# Patient Record
Sex: FEMALE | Race: WHITE | NOT HISPANIC OR LATINO | URBAN - METROPOLITAN AREA
[De-identification: names, ages, dates, MRNs, and addresses within clinical notes are randomized per-mention and may not be internally consistent; named-entity substitution may affect disease eponyms.]

---

## 2017-10-20 ENCOUNTER — TELEPHONE (OUTPATIENT)
Dept: OPHTHALMOLOGY | Facility: CLINIC | Age: 72
End: 2017-10-20

## 2017-10-20 NOTE — TELEPHONE ENCOUNTER
----- Message from Felisa Ponce sent at 10/20/2017  2:38 PM CDT -----  Regarding: Please forward to appropriate person  Contact: [phone]  Pt Ph # [phone],    Referring Clinic called today to see if you can please move up Pt Appt,    Pt scheduled on 12/08/17 with Lesia Joya for Uveitis,    They would like her seen sooner please,    Thanks,  Felisa in Call Center    Please DO NOT send this message and/or reply back to sender.  Call Center Representatives DO NOT respond to messages.

## 2017-10-20 NOTE — TELEPHONE ENCOUNTER
Left message at [phone] on phone contact number below (pt's home number)  Dr. white in one 1/2 day on Friday at our clinic  Out this Friday and next Friday and unable to schedule sooner  Asked pt to make sure referring notes faxed and provided fax number and direct triage number for further assistance.  Srinivas Pathak RN 2:51 PM 10/20/17

## 2017-10-26 NOTE — TELEPHONE ENCOUNTER
Pt scheduled December 6th with dr. white    Spoke to pt on call back today  No current symptoms  Reviewed primary eye MD to manage until appt with dr. christopher White to review long- tem treatment options then and pt to f/u with local eye doctor for new symptoms  Reviewed dr. white at [facility] for Uveitis clinic 1/2 day per week and unable to accommodate sooner appt.  Srinivas Pathak RN 7:56 AM 10/26/17

## 2017-12-08 ENCOUNTER — OFFICE VISIT (OUTPATIENT)
Dept: OPHTHALMOLOGY | Facility: CLINIC | Age: 72
End: 2017-12-08
Attending: OPHTHALMOLOGY
Payer: MEDICARE

## 2017-12-08 DIAGNOSIS — Z83.511 FAMILY HISTORY OF GLAUCOMA: ICD-10-CM

## 2017-12-08 DIAGNOSIS — H20.9 ANTERIOR UVEITIS: Primary | ICD-10-CM

## 2017-12-08 DIAGNOSIS — Z96.1 PSEUDOPHAKIA OF BOTH EYES: ICD-10-CM

## 2017-12-08 DIAGNOSIS — H20.9 ANTERIOR UVEITIS: ICD-10-CM

## 2017-12-08 LAB
ALBUMIN SERPL-MCNC: 3.8 G/DL (ref 3.4–5)
ALP SERPL-CCNC: 89 U/L (ref 40–150)
ALT SERPL W P-5'-P-CCNC: 32 U/L (ref 0–50)
ANION GAP SERPL CALCULATED.3IONS-SCNC: 8 MMOL/L (ref 3–14)
AST SERPL W P-5'-P-CCNC: 19 U/L (ref 0–45)
BASOPHILS # BLD AUTO: 0.1 10E9/L (ref 0–0.2)
BASOPHILS NFR BLD AUTO: 0.8 %
BILIRUB SERPL-MCNC: 0.3 MG/DL (ref 0.2–1.3)
BUN SERPL-MCNC: 12 MG/DL (ref 7–30)
CALCIUM SERPL-MCNC: 8.9 MG/DL (ref 8.5–10.1)
CHLORIDE SERPL-SCNC: 106 MMOL/L (ref 94–109)
CO2 SERPL-SCNC: 25 MMOL/L (ref 20–32)
CREAT SERPL-MCNC: 0.9 MG/DL (ref 0.52–1.04)
DIFFERENTIAL METHOD BLD: ABNORMAL
EOSINOPHIL # BLD AUTO: 0.2 10E9/L (ref 0–0.7)
EOSINOPHIL NFR BLD AUTO: 2.5 %
ERYTHROCYTE [DISTWIDTH] IN BLOOD BY AUTOMATED COUNT: 13.8 % (ref 10–15)
GFR SERPL CREATININE-BSD FRML MDRD: 61 ML/MIN/1.7M2
GLUCOSE SERPL-MCNC: 81 MG/DL (ref 70–99)
HCT VFR BLD AUTO: 40.7 % (ref 35–47)
HGB BLD-MCNC: 12.6 G/DL (ref 11.7–15.7)
IMM GRANULOCYTES # BLD: 0 10E9/L (ref 0–0.4)
IMM GRANULOCYTES NFR BLD: 0.2 %
LYMPHOCYTES # BLD AUTO: 3.7 10E9/L (ref 0.8–5.3)
LYMPHOCYTES NFR BLD AUTO: 38 %
MCH RBC QN AUTO: 28.8 PG (ref 26.5–33)
MCHC RBC AUTO-ENTMCNC: 31 G/DL (ref 31.5–36.5)
MCV RBC AUTO: 93 FL (ref 78–100)
MONOCYTES # BLD AUTO: 0.6 10E9/L (ref 0–1.3)
MONOCYTES NFR BLD AUTO: 6.5 %
NEUTROPHILS # BLD AUTO: 5 10E9/L (ref 1.6–8.3)
NEUTROPHILS NFR BLD AUTO: 52 %
NRBC # BLD AUTO: 0 10*3/UL
NRBC BLD AUTO-RTO: 0 /100
PLATELET # BLD AUTO: 303 10E9/L (ref 150–450)
POTASSIUM SERPL-SCNC: 3.9 MMOL/L (ref 3.4–5.3)
PROT SERPL-MCNC: 7.2 G/DL (ref 6.8–8.8)
RBC # BLD AUTO: 4.37 10E12/L (ref 3.8–5.2)
SODIUM SERPL-SCNC: 139 MMOL/L (ref 133–144)
WBC # BLD AUTO: 9.6 10E9/L (ref 4–11)

## 2017-12-08 PROCEDURE — 99213 OFFICE O/P EST LOW 20 MIN: CPT | Mod: ZF

## 2017-12-08 PROCEDURE — 92133 CPTRZD OPH DX IMG PST SGM ON: CPT | Mod: ZF | Performed by: OPHTHALMOLOGY

## 2017-12-08 RX ORDER — BUPROPION HYDROCHLORIDE 300 MG/1
TABLET ORAL
COMMUNITY

## 2017-12-08 RX ORDER — TRIAMCINOLONE ACETONIDE 1 MG/G
CREAM TOPICAL
COMMUNITY

## 2017-12-08 RX ORDER — LOSARTAN POTASSIUM 100 MG/1
TABLET ORAL
COMMUNITY
Start: 2012-12-01

## 2017-12-08 RX ORDER — LEVOTHYROXINE SODIUM 112 UG/1
TABLET ORAL
COMMUNITY

## 2017-12-08 RX ORDER — KETOCONAZOLE 20 MG/G
CREAM TOPICAL
COMMUNITY
Start: 2015-12-01

## 2017-12-08 RX ORDER — PREDNISOLONE ACETATE 10 MG/ML
SUSPENSION/ DROPS OPHTHALMIC
COMMUNITY

## 2017-12-08 RX ORDER — GABAPENTIN 300 MG/1
CAPSULE ORAL
COMMUNITY

## 2017-12-08 RX ORDER — GLIMEPIRIDE 1 MG/1
TABLET ORAL
COMMUNITY
Start: 2012-12-01

## 2017-12-08 RX ORDER — ATORVASTATIN CALCIUM 20 MG/1
TABLET, FILM COATED ORAL
COMMUNITY

## 2017-12-08 RX ORDER — ACYCLOVIR 400 MG/1
TABLET ORAL
COMMUNITY

## 2017-12-08 RX ORDER — MIRTAZAPINE 30 MG/1
TABLET, FILM COATED ORAL
COMMUNITY

## 2017-12-08 RX ORDER — VENLAFAXINE HYDROCHLORIDE 150 MG/1
CAPSULE, EXTENDED RELEASE ORAL
COMMUNITY

## 2017-12-08 ASSESSMENT — VISUAL ACUITY
OS_CC+: -1
OD_CC+: -2
CORRECTION_TYPE: GLASSES
OS_CC: 20/25
OD_CC: 20/20
METHOD: SNELLEN - LINEAR

## 2017-12-08 ASSESSMENT — REFRACTION_WEARINGRX
OD_HPRISM: 5 BO
OS_ADD: +2.50
OD_ADD: +2.50
OS_AXIS: 165
OS_HPRISM: 5 BO
OS_VPRISM: 1 BU
OS_CYLINDER: +1.50
OD_AXIS: 170
OS_SPHERE: -2.00
OD_SPHERE: -0.50
OD_CYLINDER: +1.00

## 2017-12-08 ASSESSMENT — SLIT LAMP EXAM - LIDS
COMMENTS: NORMAL
COMMENTS: NORMAL

## 2017-12-08 ASSESSMENT — CONF VISUAL FIELD
METHOD: COUNTING FINGERS
OD_NORMAL: 1
OS_NORMAL: 1

## 2017-12-08 ASSESSMENT — TONOMETRY
IOP_METHOD: TONOPEN
OS_IOP_MMHG: 21
OD_IOP_MMHG: 19

## 2017-12-08 ASSESSMENT — CUP TO DISC RATIO
OD_RATIO: 0.55
OS_RATIO: 0.5

## 2017-12-08 ASSESSMENT — EXTERNAL EXAM - LEFT EYE: OS_EXAM: NORMAL

## 2017-12-08 ASSESSMENT — EXTERNAL EXAM - RIGHT EYE: OD_EXAM: NORMAL

## 2017-12-08 NOTE — LETTER
"December 8, 2017      Shawn Ronan, MD [facility]  [address] [facility] [place] [address]  Fax: [phone]        RE: Viji Little  [address]  [facility] [address]       Dear Dr. Perera,    Thank you for referring your patient, Viji Little, to the uveitis clinic at [facility]. Her uveitis was quiescent today off treatment. Anterior uveitis workup was unremarkable. I did not pursue additional workup (such as intraocular lymphoma workup) since there was no sign of intermediate or posterior uveitis. Please see a copy of my visit note below.    CC: Uveitis evaluation    HPI: Viji Little is a 72 year old female who presents with a history of recurrent anterior uveitis both eyes. She reports having constant photophobia both eyes, not really worse with flares. She finished a taper of Predforte last week, no eye drops currently. She feels that her eyes are at baseline today.    Ocular history:   1. 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 Sometimes simultaneous, sometimes unilateral. Flare symptoms: eye redness, fuzzy vision, sometimes pain. Has been treated only with eye drops: prednisolone acetate, dilating drops. She reports 1 iritis flare in the last year. Usually treated for about 2 weeks, though most recent flare lasted longer (end of August - Oct 20, ~2 months).   *Exam notes from most recent flare mention \"vitritis\" but this may have been cellular spillover from anterior inflammation.    2. Cataract extraction/IOL both eyes (Feb 2016 right eye) (June 2017 left eye).    No history of ocular injections or laser. No history of ocular injury.    Medical history: Positive for depression, asthma, HTN, diabetes mellitus type 2, basal cell carcinoma x3 s/p excision (2012, 2017x2), genital herpes, broken leg (2007), s/p cholecystectomy (2000).    Review of systems: Positive for hearing loss, nodule on left ear lobe (but " "not painful, erythematous, or with associated swelling), shortness of breath with exertion (stable, patient attributes to asthma - follows with pulmonary), and genital sores (diagnosed with herpes, symptoms for 30 years, has taken acyclovir to control for the last 15 years). No fatigue, headache, fevers/chills, seizures, syncope, numbness/tingling, weakness, oral ulcers, pathergy reaction, chronic cough, rash, joint pain/stiffness, diarrhea, bloody stools, or hematuria.    Family history: Positive for glaucoma (sister, maternal aunt), vasculitis (maternal aunt), breast cancer (sister), and multiple myeloma (maternal aunt).    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 She does not drink alcohol. No IV drug use. No pets. She has never lived outside the [place].    Laboratory/Imaging Results:  12.8.17 labs here: Quantiferon-Tb, RPR, anti-Treponemal antibody, serum ACE, anti-MPO Ab, anti-pro3 Ab, and complete metabolic panel (CMP) all negative/normal. complete blood count (CBC) with differential within normal limits except for slightly low MCHC (31.0, normal range 31.5-36.5).  Outside studies:  4.26.17 Chest CT report:  \"FINDINGS: Previous groundglass infiltrates have resolved. Scattered peripheral nodules have also resolved. Calcified granuloma in the right upper lobe demonstrated on image 9. No acute consolidation.  Heart size normal. No pericardial or pleural effusion. Calcified mediastinal lymph nodes are again noted, consistent with granulomatous disease. 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  Limited visualization of the upper abdomen. No adenopathy. Cholecystectomy noted.\"  1.26.17: ESR slightly elevated at 27 (normal 0-20).  12.21.16: Complete blood count (CBC) with differential and BMP within normal limits.  12.6.13: RF, SABIHA neg    Ocular Imaging:  Macular OCT 12.8.17:  Right eye: good foveal contour, no IRF/SRF,  um.  Left eye: good foveal contour, no IRF/SRF, CSF " 310 um.    RNFL OCT 12.8.17:  Right eye: G77. Thin TS, borderline NI, other segments within normal limits.  Left eye: G77. Thin TS, borderline NI, other segments within normal limits.    Impression/Plan:  1. 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 No sign of associated intermediate or posterior uveitis. No inflammatory sequelae. Without definite intermediate or posterior involvement, intraocular lymphoma is unlikely, so do not recommend lymphoma workup based on ocular disease at this time.   - Basic anterior uveitis workup ordered today and negative (as detailed above).   - Granulomas noted on chest CT, so sarcoidosis is a consideration, despite normal ACE.   - With relatively infrequent flares that can be controlled with topical corticosteroids, systemic                 immunosuppression is not indicated at this time.   - Recommend topical corticosteroids as needed to control disease, though no treatment needed at                 this time.    2. Pseudophakia both eyes, stable.   - Monitor    3. Glaucoma suspect based on enlarged C:D ratio and thinning on retinal nerve fiber layer OCT. Also, positive family history and intraocular pressures on high end of normal today (19 right eye, 21 left eye).   - Recommend glaucoma suspect evaluation at [facility] within 2 months    4. 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   - Artificial tears as needed    Return to uveitis clinic in 6 months, V/T.      Again, thank you for allowing me to participate in the care of your patient.        Sincerely,      Lesia Joya MD

## 2017-12-08 NOTE — PROGRESS NOTES
"CC: uveitis evaluation    HPI: Viji Little is a 72 year old female who presents with a history of recurrent anterior uveitis both eyes. She reports having constant photophobia both eyes, not really worse with flares. She finished a taper of Predforte last week, no eye drops currently. She feels that her eyes are at baseline today.    Ocular history:   1. 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 Sometimes simultaneous, sometimes unilateral. Flare symptoms: eye redness, fuzzy vision, sometimes pain. Has been treated only with eye drops: prednisolone acetate, dilating drops. She reports 1 iritis flare in the last year. Usually treated for about 2 weeks, though most recent flare lasted longer (end of August - Oct 20, ~2 months).   *Exam notes from most recent flare mention \"vitritis\" but this may have been cellular spillover from anterior inflammation.    2. Cataract extraction/IOL both eyes (Feb 2016 right eye) (June 2017 left eye).    No history of ocular injections or laser. No history of ocular injury.    Medical history: Positive for depression, asthma, HTN, diabetes mellitus type 2, basal cell carcinoma x3 s/p excision (2012, 2017x2), genital herpes, broken leg (2007), s/p cholecystectomy (2000).    Review of systems: Positive for hearing loss, nodule on left ear lobe (but not painful, erythematous, or with associated swelling), shortness of breath with exertion (stable, patient attributes to asthma - follows with pulmonary), and genital sores (diagnosed with herpes, symptoms for 30 years, has taken acyclovir to control for the last 15 years). No fatigue, headache, fevers/chills, seizures, syncope, numbness/tingling, weakness, oral ulcers, pathergy reaction, chronic cough, rash, joint pain/stiffness, diarrhea, bloody stools, or hematuria.    Family history: Positive for glaucoma (sister, maternal aunt), vasculitis (maternal aunt), breast cancer (sister), and multiple myeloma " "(maternal aunt).    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 She does not drink alcohol. No IV drug use. No pets. She has never lived outside the [place].    Laboratory/Imaging Results:  12.8.17 labs here: Quantiferon-Tb, RPR, anti-Treponemal antibody, serum ACE, anti-MPO Ab, anti-pro3 Ab, and complete metabolic panel (CMP) all negative/normal. complete blood count (CBC) with differential within normal limits except for slightly low MCHC (31.0, normal range 31.5-36.5).  Outside studies:  4.26.17 Chest CT report:  \"FINDINGS: Previous groundglass infiltrates have resolved. Scattered peripheral nodules have also resolved. Calcified granuloma in the right upper lobe demonstrated on image 9. No acute consolidation.  Heart size normal. No pericardial or pleural effusion. Calcified mediastinal lymph nodes are again noted, consistent with granulomatous disease. 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  Limited visualization of the upper abdomen. No adenopathy. Cholecystectomy noted.\"  1.26.17: ESR slightly elevated at 27 (normal 0-20).  12.21.16: Complete blood count (CBC) with differential and BMP within normal limits.  12.6.13: RF, SABIHA neg    Ocular Imaging:  Macular OCT 12.8.17:  Right eye: good foveal contour, no IRF/SRF,  um.  Left eye: good foveal contour, no IRF/SRF,  um.    RNFL OCT 12.8.17:  Right eye: G77. Thin TS, borderline NI, other segments within normal limits.  Left eye: G77. Thin TS, borderline NI, other segments within normal limits.    Impression/Plan:  1. 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 No sign of associated intermediate or posterior uveitis. No inflammatory sequelae. Without definite intermediate or posterior involvement, intraocular lymphoma is unlikely, so do not recommend lymphoma workup based on ocular disease at this time.   - Basic anterior uveitis workup ordered today and negative (as " detailed above).   - Granulomas noted on chest CT, so sarcoidosis is a consideration, despite normal ACE.   - With relatively infrequent flares that can be controlled with topical corticosteroids, systemic immunosuppression is not indicated at this time.   - Recommend topical corticosteroids as needed to control disease, though no treatment needed at this time.    2. Pseudophakia both eyes, stable.   - Monitor    3. Glaucoma suspect based on enlarged C:D ratio and thinning on retinal nerve fiber layer OCT. Also, positive family history and intraocular pressures on high end of normal today (19 right eye, 21 left eye).   - Recommend glaucoma suspect evaluation at [facility] within 2 months    4. 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   - Artificial tears as needed    Return to uveitis clinic in 6 months, V/T.      Attending Physician Attestation:  Complete documentation of historical and exam elements from today's encounter can be found in the full encounter summary report (not reduplicated in this progress note).  I personally obtained the chief complaint(s) and history of present illness.  I confirmed and edited as necessary the review of systems, past medical/surgical history, family history, social history, and examination findings as documented by others; and I examined the patient myself.  I personally reviewed the relevant tests, images, and reports as documented above.  I formulated and edited as necessary the assessment and plan and discussed the findings and management plan with the patient and family.  - Lesia Joya M.D.

## 2017-12-08 NOTE — PATIENT INSTRUCTIONS
Follow up at [facility] within 2 months for glaucoma suspect evaluation (thinning on retinal nerve fiber layer OCT).

## 2017-12-08 NOTE — NURSING NOTE
Chief Complaints and History of Present Illnesses   Patient presents with     Consult For     Anterior Uveitis     HPI    Affected eye(s):  Both   Symptoms:        Frequency:  Constant       Do you have eye pain now?:  No      Comments:  States va is the same  Hx of Flashes and Floaters  No red watery or dry  Radha Chaney COT 12:41 PM December 8, 2017

## 2017-12-08 NOTE — MR AVS SNAPSHOT
After Visit Summary   12/8/2017    Viji Little    MRN: [id number]           Patient Information     Date Of Birth          1945        Visit Information        Provider Department      12/8/2017 12:30 PM Lesia Joya MD [facility]        Today's Diagnoses     Anterior uveitis    -  1      Care Instructions    Follow up at [facility] within 2 months for glaucoma suspect evaluation (thinning on retinal nerve fiber layer OCT).          Follow-ups after your visit        Follow-up notes from your care team     Return in about 6 months (around 6/8/2018).      Your next 10 appointments already scheduled     Jun 08, 2018 12:30 PM CDT   RETURN UVEITIS with Lesia Joya MD   Eye Clinic ([facility])    [address]  [address]  [facility] [address] [phone]              Future tests that were ordered for you today     Open Future Orders        Priority Expected Expires Ordered    Angiotensin converting enzyme Routine  3/8/2018 12/8/2017    Vasculitis panel Routine  3/8/2018 12/8/2017    CBC with platelets differential Routine  3/8/2018 12/8/2017    Comprehensive metabolic panel Routine  3/8/2018 12/8/2017    RPR screen with reflex to confirm Routine  3/8/2018 12/8/2017    Anti Treponema Routine  3/8/2018 12/8/2017    M Tuberculosis by Quantiferon Routine  3/8/2018 12/8/2017            Who to contact     Please call your clinic at [phone] to:    Ask questions about your health    Make or cancel appointments    Discuss your medicines    Learn about your test results    Speak to your doctor   If you have compliments or concerns about an experience at your clinic, or if you wish to file a complaint, please contact [facility] Patient Relations at [phone] or email us at [email or web address]         Additional Information About Your Visit        MyChart Information     MyChart gives you  secure access to your electronic health record. If you see a primary care provider, you can also send messages to your care team and make appointments. If you have questions, please call your primary care clinic.  If you do not have a primary care provider, please call [phone] and they will assist you.      "[employer]" is an electronic gateway that provides easy, online access to your medical records. With "[employer]", you can request a clinic appointment, read your test results, renew a prescription or communicate with your care team.     To access your existing account, please contact your [facility] or call [phone] for assistance.        Care EveryWhere ID     This is your Care EveryWhere ID. This could be used by other organizations to access your [facility] medical records  [id number]         Blood Pressure from Last 3 Encounters:   No data found for BP    Weight from Last 3 Encounters:   No data found for Wt              We Performed the Following     OCT Optic Nerve RNFL Spectralis OU (both eyes)     OCT Retina Spectralis OU (both eyes)        Primary Care Provider Office Phone # Fax #    Kwadwo GAEL [facility] [phone]       [facility] [address]   [facility] [address]        Equal Access to Services     BILLY VIEYRA : Hadii aad ku hadasho Soomaali, waaxda luqadaha, qaybta kaalmada adeegyada, waxay idiin hayaan alo palominoarapraveen lakristal valentine. So [facility] [phone].    ATENCIÓN: Si habla español, tiene a edge disposición servicios gratuitos de asistencia lingüística. Llame al [phone].    We comply with applicable federal civil rights laws and [place] laws. We do not discriminate on the basis of race, color, national origin, age, disability, sex, sexual orientation, or gender identity.            Thank you!     Thank you for choosing EYE CLINIC  for your care. Our goal is always to provide you with excellent care. Hearing back from our patients is one  way we can continue to improve our services. Please take a few minutes to complete the written survey that you may receive in the mail after your visit with us. Thank you!             Your Updated Medication List - Protect others around you: Learn how to safely use, store and throw away your medicines at www.disposemymeds.org.          This list is accurate as of: 12/8/17  3:09 PM.  Always use your most recent med list.                   Brand Name Dispense Instructions for use Diagnosis    acyclovir 400 MG tablet    ZOVIRAX          aspirin 81 MG tablet           atorvastatin 20 MG tablet    LIPITOR          EFFEXOR  MG 24 hr capsule   Generic drug:  venlafaxine           gabapentin 300 MG capsule    NEURONTIN          glimepiride 1 MG tablet    AMARYL          ketoconazole 2 % cream    NIZORAL          losartan 100 MG tablet    COZAAR          metFORMIN 500 MG tablet    GLUCOPHAGE          mirtazapine 30 MG tablet    REMERON          prednisoLONE acetate 1 % ophthalmic susp    PRED FORTE          QVAR 80 MCG/ACT Inhaler   Generic drug:  beclomethasone           SYNTHROID 112 MCG tablet   Generic drug:  levothyroxine           tiotropium 2.5 MCG/ACT inhalation aerosol    SPIRIVA RESPIMAT          triamcinolone 0.1 % cream    KENALOG          WELLBUTRIN  MG 24 hr tablet   Generic drug:  buPROPion

## 2017-12-09 LAB
MYELOPEROXIDASE AB SER-ACNC: <0.2 AI (ref 0–0.9)
PROTEINASE3 IGG SER-ACNC: <0.2 AI (ref 0–0.9)
T PALLIDUM IGG+IGM SER QL: NEGATIVE

## 2017-12-10 LAB
M TB TUBERC IFN-G BLD QL: NEGATIVE
M TB TUBERC IFN-G/MITOGEN IGNF BLD: 0 IU/ML
RPR SER QL: NEGATIVE

## 2017-12-12 LAB — ACE SERPL-CCNC: 34 U/L (ref 9–67)

## 2017-12-20 PROBLEM — H20.9 ANTERIOR UVEITIS: Status: ACTIVE | Noted: 2017-12-20

## 2017-12-22 ENCOUNTER — TELEPHONE (OUTPATIENT)
Dept: OPHTHALMOLOGY | Facility: CLINIC | Age: 72
End: 2017-12-22

## 2018-06-15 ENCOUNTER — OFFICE VISIT (OUTPATIENT)
Dept: OPHTHALMOLOGY | Facility: CLINIC | Age: 73
End: 2018-06-15
Attending: OPHTHALMOLOGY
Payer: MEDICARE

## 2018-06-15 DIAGNOSIS — Z83.511 FAMILY HISTORY OF GLAUCOMA: ICD-10-CM

## 2018-06-15 DIAGNOSIS — H20.9 ANTERIOR UVEITIS: Primary | ICD-10-CM

## 2018-06-15 DIAGNOSIS — Z96.1 PSEUDOPHAKIA OF BOTH EYES: ICD-10-CM

## 2018-06-15 PROCEDURE — G0463 HOSPITAL OUTPT CLINIC VISIT: HCPCS | Mod: ZF | Performed by: TECHNICIAN/TECHNOLOGIST

## 2018-06-15 RX ORDER — AMLODIPINE BESYLATE 5 MG/1
5 TABLET ORAL
COMMUNITY
Start: 2018-04-18 | End: 2019-04-18

## 2018-06-15 ASSESSMENT — VISUAL ACUITY
OS_CC: 20/25
OS_CC+: -1
OD_CC: 20/20
OD_CC+: -2
CORRECTION_TYPE: GLASSES
METHOD: SNELLEN - LINEAR

## 2018-06-15 ASSESSMENT — EXTERNAL EXAM - LEFT EYE: OS_EXAM: NORMAL

## 2018-06-15 ASSESSMENT — CONF VISUAL FIELD
OS_INFERIOR_TEMPORAL_RESTRICTION: 3
OS_SUPERIOR_TEMPORAL_RESTRICTION: 3
METHOD: COUNTING FINGERS
OD_NORMAL: 1

## 2018-06-15 ASSESSMENT — TONOMETRY
IOP_METHOD: ICARE
OS_IOP_MMHG: 19
OD_IOP_MMHG: 19

## 2018-06-15 ASSESSMENT — EXTERNAL EXAM - RIGHT EYE: OD_EXAM: NORMAL

## 2018-06-15 ASSESSMENT — SLIT LAMP EXAM - LIDS
COMMENTS: NORMAL
COMMENTS: NORMAL

## 2018-06-15 ASSESSMENT — CUP TO DISC RATIO
OD_RATIO: 0.55
OS_RATIO: 0.5

## 2018-06-15 NOTE — NURSING NOTE
Chief Complaints and History of Present Illnesses   Patient presents with     Follow Up For     anterior uveitis     HPI    Symptoms:              Comments:  Patient states vision has been doing fine in both eyes. Denies any new flare-ups or eye pain.   -patient states Dr. Joya recommended her to get a glaucoma screening for glaucoma since her FREDI however she did not do it yet.     LÓPEZ Gutiérrez 6/15/2018 12:30 PM

## 2018-06-15 NOTE — LETTER
"6/15/2018      RE: Viji Little  [address]  [facility] [address]       HPI: Viji Little is a 73 year old female who presents for follow up of recurrent anterior uveitis both eyes. She reports that both eyes are doing well. She has been off topical corticosteroids since November 2018. No symptoms of a uveitis flare since fall 2018.    Ocular history:   1. 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 Sometimes affects both eyes simultaneously, sometimes unilateral. Flare symptoms: eye redness, fuzzy vision, sometimes pain. Has been treated only with eye drops: prednisolone acetate, dilating drops. She reports 1 iritis flare in the last year. Usually treated for about 2 weeks, though most recent flare lasted longer (end of August - Oct 20, ~2 months).   *Exam notes from most recent flare mention \"vitritis\" but this may have been cellular spillover from anterior inflammation.    2. Cataract extraction/IOL both eyes (Feb 2016 right eye) (June 2017 left eye).    No history of ocular injections or laser. No history of ocular injury.    Medical history: Positive for depression, asthma, HTN, diabetes mellitus type 2, basal cell carcinoma x3 s/p excision (2012, 2017x2), genital herpes, broken leg (2007), s/p cholecystectomy (2000).    Family history: Positive for glaucoma (sister, maternal aunt), vasculitis (maternal aunt), breast cancer (sister), and multiple myeloma (maternal aunt).    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 She does not drink alcohol. No IV drug use. No pets. She has never lived outside the [place].    Laboratory/Imaging Results:  12.8.17 labs here: Quantiferon-Tb, RPR, anti-Treponemal antibody, serum ACE, anti-MPO Ab, anti-pro3 Ab, and complete metabolic panel (CMP) all negative/normal. complete blood count (CBC) with differential within normal limits except for slightly low MCHC (31.0, normal range " "31.5-36.5).  Outside studies:  4.26.17 Chest CT report:  \"FINDINGS: Previous groundglass infiltrates have resolved. Scattered peripheral nodules have also resolved. Calcified granuloma in the right upper lobe demonstrated on image 9. No acute consolidation.  Heart size normal. No pericardial or pleural effusion. Calcified mediastinal lymph nodes are again noted, consistent with granulomatous disease. 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  Limited visualization of the upper abdomen. No adenopathy. Cholecystectomy noted.\"  1.26.17: ESR slightly elevated at 27 (normal 0-20).  12.21.16: Complete blood count (CBC) with differential and BMP within normal limits.  12.6.13: RF, SABIHA neg    Ocular Imaging:  Macular OCT 12.8.17:  Right eye: good foveal contour, no IRF/SRF,  um.  Left eye: good foveal contour, no IRF/SRF,  um.    RNFL OCT 12.8.17:  Right eye: G77. Thin TS, borderline NI, other segments within normal limits.  Left eye: G77. Thin TS, borderline NI, other segments within normal limits.    Impression/Plan:  1. 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 Granulomas noted on chest CT, so sarcoidosis is a consideration, despite normal ACE. Ms. Little does have a history of asthma, treated with inhalers. Other uveitis workup negative. No sign of associated intermediate or posterior uveitis. No inflammatory sequelae. Without definite intermediate or posterior involvement, intraocular lymphoma is unlikely, so do not recommend lymphoma workup based on ocular disease at this time.   - With relatively infrequent flares that can be controlled with topical corticosteroids, systemic immunosuppression is not indicated at this time.   - Recommend topical corticosteroids as needed to control disease, though no treatment needed at this time.    2. Pseudophakia both eyes, stable.   - Monitor    3. Glaucoma suspect based on enlarged C:D ratio and thinning on retinal nerve fiber " layer OCT. Also, positive family history and intraocular pressures on high end of normal today (19 right eye, 19 left eye).   - Again recommend glaucoma suspect evaluation at [facility] within 2 months. Patient reports that she plans to follow up with Lauren Christensen, OD. Letter sent to Nancy Christensen and [facility].    4. 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   - Artificial tears as needed    Return to uveitis clinic as needed.    Attending Physician Attestation:  Complete documentation of historical and exam elements from today's encounter can be found in the full encounter summary report (not reduplicated in this progress note).  I personally obtained the chief complaint(s) and history of present illness.  I confirmed and edited as necessary the review of systems, past medical/surgical history, family history, social history, and examination findings as documented by others; and I examined the patient myself.  I personally reviewed the relevant tests, images, and reports as documented above.  I formulated and edited as necessary the assessment and plan and discussed the findings and management plan with the patient and family.  - Lesia Joya M.D.      Lesia Joya MD

## 2018-06-15 NOTE — PROGRESS NOTES
"HPI: Viji Little is a 73 year old female who presents for follow up of recurrent anterior uveitis both eyes. She reports that both eyes are doing well. She has been off topical corticosteroids since November 2018. No symptoms of a uveitis flare since fall 2018.    Ocular history:   1. 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 Sometimes affects both eyes simultaneously, sometimes unilateral. Flare symptoms: eye redness, fuzzy vision, sometimes pain. Has been treated only with eye drops: prednisolone acetate, dilating drops. She reports 1 iritis flare in the last year. Usually treated for about 2 weeks, though most recent flare lasted longer (end of August - Oct 20, ~2 months).   *Exam notes from most recent flare mention \"vitritis\" but this may have been cellular spillover from anterior inflammation.    2. Cataract extraction/IOL both eyes (Feb 2016 right eye) (June 2017 left eye).    No history of ocular injections or laser. No history of ocular injury.    Medical history: Positive for depression, asthma, HTN, diabetes mellitus type 2, basal cell carcinoma x3 s/p excision (2012, 2017x2), genital herpes, broken leg (2007), s/p cholecystectomy (2000).    Family history: Positive for glaucoma (sister, maternal aunt), vasculitis (maternal aunt), breast cancer (sister), and multiple myeloma (maternal aunt).    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 She does not drink alcohol. No IV drug use. No pets. She has never lived outside the [place].    Laboratory/Imaging Results:  12.8.17 labs here: Quantiferon-Tb, RPR, anti-Treponemal antibody, serum ACE, anti-MPO Ab, anti-pro3 Ab, and complete metabolic panel (CMP) all negative/normal. complete blood count (CBC) with differential within normal limits except for slightly low MCHC (31.0, normal range 31.5-36.5).  Outside studies:  4.26.17 Chest CT report:  \"FINDINGS: Previous " "groundglass infiltrates have resolved. Scattered peripheral nodules have also resolved. Calcified granuloma in the right upper lobe demonstrated on image 9. No acute consolidation.  Heart size normal. No pericardial or pleural effusion. Calcified mediastinal lymph nodes are again noted, consistent with granulomatous disease. 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  Limited visualization of the upper abdomen. No adenopathy. Cholecystectomy noted.\"  1.26.17: ESR slightly elevated at 27 (normal 0-20).  12.21.16: Complete blood count (CBC) with differential and BMP within normal limits.  12.6.13: RF, SABIHA neg    Ocular Imaging:  Macular OCT 12.8.17:  Right eye: good foveal contour, no IRF/SRF,  um.  Left eye: good foveal contour, no IRF/SRF,  um.    RNFL OCT 12.8.17:  Right eye: G77. Thin TS, borderline NI, other segments within normal limits.  Left eye: G77. Thin TS, borderline NI, other segments within normal limits.    Impression/Plan:  1. 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 Granulomas noted on chest CT, so sarcoidosis is a consideration, despite normal ACE. Ms. Little does have a history of asthma, treated with inhalers. Other uveitis workup negative. No sign of associated intermediate or posterior uveitis. No inflammatory sequelae. Without definite intermediate or posterior involvement, intraocular lymphoma is unlikely, so do not recommend lymphoma workup based on ocular disease at this time.   - With relatively infrequent flares that can be controlled with topical corticosteroids, systemic immunosuppression is not indicated at this time.   - Recommend topical corticosteroids as needed to control disease, though no treatment needed at this time.    2. Pseudophakia both eyes, stable.   - Monitor    3. Glaucoma suspect based on enlarged C:D ratio and thinning on retinal nerve fiber layer OCT. Also, positive family history and intraocular pressures on high " end of normal today (19 right eye, 19 left eye).   - Again recommend glaucoma suspect evaluation at [facility] within 2 months. Patient reports that she plans to follow up with Lauren Christensen, OD. Letter sent to Nancy Christensen and [facility].    4. 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   - Artificial tears as needed    Return to uveitis clinic as needed.    Attending Physician Attestation:  Complete documentation of historical and exam elements from today's encounter can be found in the full encounter summary report (not reduplicated in this progress note).  I personally obtained the chief complaint(s) and history of present illness.  I confirmed and edited as necessary the review of systems, past medical/surgical history, family history, social history, and examination findings as documented by others; and I examined the patient myself.  I personally reviewed the relevant tests, images, and reports as documented above.  I formulated and edited as necessary the assessment and plan and discussed the findings and management plan with the patient and family.  - Lesia Joya M.D.

## 2018-06-15 NOTE — LETTER
"Petra 15, 2018      Shawn Ronan, MD [facility]  [address] [facility] [place] [address]  Fax: [phone]       RE: Viji Little  [address]  [facility] [address]       Dear Dr. Perera:     Thank you for referring your patient, Viji Little, to the EYE CLINIC at [facility]. Please see a copy of my visit note below.    HPI: Viji Little is a 73 year old female who presents for follow up of recurrent anterior uveitis both eyes. She reports that both eyes are doing well. She has been off topical corticosteroids since November 2018. No symptoms of a uveitis flare since fall 2018.    Ocular history:   1. 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 Sometimes affects both eyes simultaneously, sometimes unilateral. Flare symptoms: eye redness, fuzzy vision, sometimes pain. Has been treated only with eye drops: prednisolone acetate, dilating drops. She reports 1 iritis flare in the last year. Usually treated for about 2 weeks, though most recent flare lasted longer (end of August - Oct 20, ~2 months).   *Exam notes from most recent flare mention \"vitritis\" but this may have been cellular spillover from anterior inflammation.    2. Cataract extraction/IOL both eyes (Feb 2016 right eye) (June 2017 left eye).    No history of ocular injections or laser. No history of ocular injury.    Medical history: Positive for depression, asthma, HTN, diabetes mellitus type 2, basal cell carcinoma x3 s/p excision (2012, 2017x2), genital herpes, broken leg (2007), s/p cholecystectomy (2000).      Family history: Positive for glaucoma (sister, maternal aunt), vasculitis (maternal aunt), breast cancer (sister), and multiple myeloma (maternal aunt).    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 She does not drink alcohol. No IV drug use. No pets. She has never lived outside the " [place].    Laboratory/Imaging Results:  12.8.17 labs here: Quantiferon-Tb, RPR, anti-Treponemal antibody, serum ACE, anti-MPO Ab, anti-pro3 Ab, and complete metabolic panel (CMP) all negative/normal. complete blood count (CBC) with differential within normal limits except for slightly low MCHC (31.0, normal range 31.5-36.5).  Outside studies:  4.26.17 Chest CT report:  \"FINDINGS: Previous groundglass infiltrates have resolved. Scattered peripheral nodules have also resolved. Calcified granuloma in the right upper lobe demonstrated on image 9. No acute consolidation.  Heart size normal. No pericardial or pleural effusion. Calcified mediastinal lymph nodes are again noted, consistent with granulomatous disease. 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  Limited visualization of the upper abdomen. No adenopathy. Cholecystectomy noted.\"  1.26.17: ESR slightly elevated at 27 (normal 0-20).  12.21.16: Complete blood count (CBC) with differential and BMP within normal limits.  12.6.13: RF, SABIHA neg    Ocular Imaging:  Macular OCT 12.8.17:  Right eye: good foveal contour, no IRF/SRF,  um.  Left eye: good foveal contour, no IRF/SRF,  um.    RNFL OCT 12.8.17:  Right eye: G77. Thin TS, borderline NI, other segments within normal limits.  Left eye: G77. Thin TS, borderline NI, other segments within normal limits.    Impression/Plan:  1. 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 Granulomas noted on chest CT, so sarcoidosis is a consideration, despite normal ACE. Ms. Little does have a history of asthma, treated with inhalers. Other uveitis workup negative. No sign of associated intermediate or posterior uveitis. No inflammatory sequelae. Without definite intermediate or posterior involvement, intraocular lymphoma is unlikely, so do not recommend lymphoma workup based on ocular disease at this time.   - With relatively infrequent flares that can be controlled with " topical corticosteroids, systemic                immunosuppression is not indicated at this time.   - Recommend topical corticosteroids as needed to control disease, though no treatment needed at                 this time.    2. Pseudophakia both eyes, stable.   - Monitor    3. Glaucoma suspect based on enlarged C:D ratio and thinning on retinal nerve fiber layer OCT. Also, positive family history and intraocular pressures on high end of normal today (19 right eye, 19 left eye).   - Again recommend glaucoma suspect evaluation at [facility] within 2 months. Patient reports that she plans to follow up with Lauren Christensen, OD. Letter sent to Nancy Christensen and [facility].      4. 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   - Artificial tears as needed    Return to uveitis clinic as needed.    Again, thank you for allowing me to participate in the care of your patient.        Sincerely,    Lesia Joya MD    Cc: MD Kwadwo Willams MD

## 2018-06-15 NOTE — MR AVS SNAPSHOT
After Visit Summary   6/15/2018    Viji Little    MRN: [id number]           Patient Information     Date Of Birth          1945        Visit Information        Provider Department      6/15/2018 12:30 PM Lesia Joya MD [facility]        Today's Diagnoses     Anterior uveitis    -  1    Pseudophakia of both eyes        Family history of glaucoma           Follow-ups after your visit        Who to contact     Please call your clinic at [phone] to:    Ask questions about your health    Make or cancel appointments    Discuss your medicines    Learn about your test results    Speak to your doctor            Additional Information About Your Visit        [facility] Information     [facility] gives you secure access to your electronic health record. If you see a primary care provider, you can also send messages to your care team and make appointments. If you have questions, please call your primary care clinic.  If you do not have a primary care provider, please call [phone] and they will assist you.      [facility] is an electronic gateway that provides easy, online access to your medical records. With [facility], you can request a clinic appointment, read your test results, renew a prescription or communicate with your care team.     To access your existing account, please contact your [facility] or call [phone] for assistance.        Care EveryWhere ID     This is your Care EveryWhere ID. This could be used by other organizations to access your [place] medical records  [id number]         Blood Pressure from Last 3 Encounters:   No data found for BP    Weight from Last 3 Encounters:   No data found for Wt              Today, you had the following     No orders found for display         Today's Medication Changes          These changes are accurate as of 6/15/18 11:59 PM.  If you have any questions, ask your nurse or doctor.               Stop taking  these medicines if you haven't already. Please contact your care team if you have questions.     tiotropium 2.5 MCG/ACT inhalation aerosol   Commonly known as:  SPIRIVA RESPIMAT   Stopped by:  Lesia Joya MD                    Primary Care Provider Office Phone # Fax #    Kwadwo Peña [phone]       [facility] [address]   [facility] [address]        Equal Access to Services     [facility]: Hadii aad ku hadasho Soomaali, waaxda luqadaha, qaybta kaalmada adeegyada, waxay idiin hayaan adeeg kharash la'aan . So [facility] [phone].    ATENCIÓN: Si laure espphilippe, tiene a edge disposición servicios gratuitos de asistencia lingüística. Leticia al [phone].    We comply with applicable federal civil rights laws and [place] laws. We do not discriminate on the basis of race, color, national origin, age, disability, sex, sexual orientation, or gender identity.            Thank you!     Thank you for choosing EYE CLINIC  for your care. Our goal is always to provide you with excellent care. Hearing back from our patients is one way we can continue to improve our services. Please take a few minutes to complete the written survey that you may receive in the mail after your visit with us. Thank you!             Your Updated Medication List - Protect others around you: Learn how to safely use, store and throw away your medicines at www.disposemymeds.org.          This list is accurate as of 6/15/18 11:59 PM.  Always use your most recent med list.                   Brand Name Dispense Instructions for use Diagnosis    acyclovir 400 MG tablet    ZOVIRAX          amLODIPine 5 MG tablet    NORVASC     Take 5 mg by mouth        aspirin 81 MG tablet           atorvastatin 20 MG tablet    LIPITOR          EFFEXOR  MG 24 hr capsule   Generic drug:  venlafaxine           gabapentin 300 MG capsule    NEURONTIN          glimepiride 1 MG tablet    AMARYL          ketoconazole 2 %  cream    NIZORAL          losartan 100 MG tablet    COZAAR          metFORMIN 500 MG tablet    GLUCOPHAGE          mirtazapine 30 MG tablet    REMERON          prednisoLONE acetate 1 % ophthalmic susp    PRED FORTE          QVAR 80 MCG/ACT Inhaler   Generic drug:  beclomethasone           SYNTHROID 112 MCG tablet   Generic drug:  levothyroxine           triamcinolone 0.1 % cream    KENALOG          WELLBUTRIN  MG 24 hr tablet   Generic drug:  buPROPion

## 2019-10-03 ENCOUNTER — HEALTH MAINTENANCE LETTER (OUTPATIENT)
Age: 74
End: 2019-10-03

## 2020-02-08 ENCOUNTER — HEALTH MAINTENANCE LETTER (OUTPATIENT)
Age: 75
End: 2020-02-08

## 2020-11-07 ENCOUNTER — HEALTH MAINTENANCE LETTER (OUTPATIENT)
Age: 75
End: 2020-11-07

## 2021-03-27 ENCOUNTER — HEALTH MAINTENANCE LETTER (OUTPATIENT)
Age: 76
End: 2021-03-27

## 2021-09-05 ENCOUNTER — HEALTH MAINTENANCE LETTER (OUTPATIENT)
Age: 76
End: 2021-09-05

## 2021-10-31 ENCOUNTER — HEALTH MAINTENANCE LETTER (OUTPATIENT)
Age: 76
End: 2021-10-31

## 2022-04-17 ENCOUNTER — HEALTH MAINTENANCE LETTER (OUTPATIENT)
Age: 77
End: 2022-04-17

## 2022-10-23 ENCOUNTER — HEALTH MAINTENANCE LETTER (OUTPATIENT)
Age: 77
End: 2022-10-23

## 2023-06-01 ENCOUNTER — HEALTH MAINTENANCE LETTER (OUTPATIENT)
Age: 78
End: 2023-06-01